# Patient Record
Sex: FEMALE | Race: OTHER | HISPANIC OR LATINO | URBAN - METROPOLITAN AREA
[De-identification: names, ages, dates, MRNs, and addresses within clinical notes are randomized per-mention and may not be internally consistent; named-entity substitution may affect disease eponyms.]

---

## 2023-08-29 ENCOUNTER — APPOINTMENT (RX ONLY)
Dept: URBAN - METROPOLITAN AREA CLINIC 164 | Facility: CLINIC | Age: 23
Setting detail: DERMATOLOGY
End: 2023-08-29

## 2023-08-29 DIAGNOSIS — Z41.9 ENCOUNTER FOR PROCEDURE FOR PURPOSES OTHER THAN REMEDYING HEALTH STATE, UNSPECIFIED: ICD-10-CM

## 2023-08-29 PROCEDURE — ? COSMETIC CONSULTATION - MICRO-NEEDLING

## 2023-08-29 PROCEDURE — ? COSMETIC CONSULTATION: HYDRAFACIAL

## 2023-08-29 PROCEDURE — ? COSMETIC QUOTE

## 2023-08-29 PROCEDURE — ? ADDITIONAL NOTES

## 2023-08-29 PROCEDURE — ? DELUXE HYDRAFACIAL

## 2023-08-29 PROCEDURE — ? COSMETIC CONSULTATION: CHEMICAL PEELS

## 2023-08-29 NOTE — PROCEDURE: ADDITIONAL NOTES
Render Risk Assessment In Note?: no
Additional Notes: Patient came in for a HydraFacial Deluxe. Patient was pleased with 1st HydraFacial and is scheduled for her 1st SkinPen on 9/08/23 at 8 AM. This is part of a package of 2 SkinPen & 2 VI Peels (Purify w/ Precision Plus).
Detail Level: Zone

## 2023-08-29 NOTE — PROCEDURE: COSMETIC QUOTE
Injectable  12 Units: 0
Include Sales Tax On Implants: Yes
Implant 7 Price/Unit (In Dollars- Use Only Numbers And Decimals): 0.00
Show Monthly Cost Breakdown: No
Face Procedure 3 Price/Unit (In Dollars- Use Only Numbers And Decimals): 265
Face Procedure 6 Price/Unit (In Dollars- Use Only Numbers And Decimals): 389
Face Procedure 9 Price/Unit (In Dollars- Use Only Numbers And Decimals): 296.10
Body Procedure 2 Price/Unit (In Dollars- Use Only Numbers And Decimals): 450
Number Of Months: 1
Face Procedure 1: Milia extraction
Face Procedure 4: Vi Peel (Advanced)
Face Procedure 1 Price/Unit (In Dollars- Use Only Numbers And Decimals): 75
Face Procedure 4 Price/Unit (In Dollars- Use Only Numbers And Decimals): 369
Face Procedure 7 Price/Unit (In Dollars- Use Only Numbers And Decimals): 199
Face Procedure 10 Cristobal/Unit (In Dollars- Use Only Numbers And Decimals): 329
Detail Level: Zone
Body Procedure 3 Price/Unit (In Dollars- Use Only Numbers And Decimals): 365
Misc Procedure 1 Price/Unit (In Dollars- Use Only Numbers And Decimals): 499
Face Procedure 2: SkinPen
Face Procedure 8: ZO 3 Step Peel
Face Procedure 5: Vi Peel Purify w/ Precision Plus
Body Procedure 1: VI Peel Body (small area)
Face Procedure 5 Units: 2
Body Procedure 1 Price/Unit (In Dollars- Use Only Numbers And Decimals): 299
Face Procedure 3: HydraFacial (Deluxe)
Face Procedure 6: VI Peel Precision Plus
Body Procedure 2: VI Peel Body (large area)
Face Procedure 7: HydraFacial (Signature)
Face Procedure 10: HydraFacial (Platinum)
Body Procedure 3: HydraFacial (Back)
Misc Procedure 1: HydraFacial Keravive
Face Procedure 8 Price/Unit (In Dollars- Use Only Numbers And Decimals): 319

## 2023-08-29 NOTE — PROCEDURE: ADDITIONAL NOTES
Detail Level: Zone
Render Risk Assessment In Note?: no
Additional Notes: Patient came in for a cosmetic consult. Patients concerns are acne scarring and texture. I recommended a package of two chemical peels (Purify w/ Precision Plus) and two SkinPen. If patient was to purchase package I would honor the second skin pen 20% off. I also I suggested maintenance HydraFacials before and after starting package. \\n\\n Patient was pleased with consult and is scheduled today on August 29 for her first HydraFacial.

## 2023-08-29 NOTE — PROCEDURE: DELUXE HYDRAFACIAL
Price (Use Numbers Only, No Special Characters Or $): 150 Price (Use Numbers Only, No Special Characters Or $): 995

## 2023-08-29 NOTE — PROCEDURE: COSMETIC CONSULTATION: CHEMICAL PEELS
Price (Use Numbers Only, No Special Characters Or $): 389 Consultation Charge $ (Use Numbers Only, No Special Characters Or $): 220

## 2023-08-29 NOTE — PROCEDURE: COSMETIC CONSULTATION - MICRO-NEEDLING
Price (Use Numbers Only, No Special Characters Or $): 705 Consultation Charge $ (Use Numbers Only, No Text Please.): 618

## 2023-08-29 NOTE — PROCEDURE: COSMETIC CONSULTATION: HYDRAFACIAL
Price (Use Numbers Only, No Special Characters Or $): 644 Price (Use Numbers Only, No Special Characters Or $): 833